# Patient Record
Sex: FEMALE | Race: WHITE | NOT HISPANIC OR LATINO
[De-identification: names, ages, dates, MRNs, and addresses within clinical notes are randomized per-mention and may not be internally consistent; named-entity substitution may affect disease eponyms.]

---

## 2019-05-20 ENCOUNTER — APPOINTMENT (OUTPATIENT)
Dept: VASCULAR SURGERY | Facility: CLINIC | Age: 84
End: 2019-05-20
Payer: MEDICARE

## 2019-05-20 VITALS — OXYGEN SATURATION: 98 % | DIASTOLIC BLOOD PRESSURE: 69 MMHG | SYSTOLIC BLOOD PRESSURE: 165 MMHG | HEART RATE: 101 BPM

## 2019-05-20 DIAGNOSIS — L97.429 NON-PRESSURE CHRONIC ULCER OF LEFT HEEL AND MIDFOOT WITH UNSPECIFIED SEVERITY: ICD-10-CM

## 2019-05-20 PROCEDURE — 99203 OFFICE O/P NEW LOW 30 MIN: CPT

## 2019-05-20 RX ORDER — SILVER SULFADIAZINE 10 MG/G
1 CREAM TOPICAL TWICE DAILY
Qty: 1 | Refills: 0 | Status: ACTIVE | COMMUNITY
Start: 2019-05-20 | End: 1900-01-01

## 2019-05-23 NOTE — PROCEDURE
[FreeTextEntry1] : The wound was debrided in office, Silvadene was applied and the wound was wrapped in gauze

## 2019-05-23 NOTE — ADDENDUM
[FreeTextEntry1] : This note was written by Kendal Salas on 05/20/2019  acting as scribe for Dr. Rollins.

## 2019-05-23 NOTE — ASSESSMENT
[FreeTextEntry1] : 84 y/o F with wound on R medial shin after falling on 4/28/19.The wound was debrided in office, Silvadene was applied and the wound was wrapped in gauze. Patient should wash the wound daily with soap and water to get rid of the dead tissue. I prescribed Silvadene and instructed patient to apply it daily for the next week. She can go in the ocean but should not go into a pool before the wound closes. She will follow up in one week.

## 2019-05-23 NOTE — HISTORY OF PRESENT ILLNESS
[FreeTextEntry1] : 82 y/o F presents today for initial evaluation of wound on R medial shin she sustained after falling on 4/28/19 at her home. She reports that she was bleeding profusely so they went to the ER where they stitched up the wound. Then, when the wound started to become red and painful, patient went to two different urgent care clinics where they told her that the wound was infected and she was given an oral antibiotic. She also went to a dermatologist who told her to clean the wound everyday and apply Aquaphor. Patient was told to go to a wound care center but she couldn't find one and was referred here instead. Patient presents here three weeks later for evaluation of the non-healing wound.\par \par She also reports that she had a recent MRI of the R knee which demonstrated meniscal and ligament tears. Patient went to surgeon, Dr. Quinonez, who informed her that these are degenerative tears that may have been present for a long time. He did not recommend a surgical intervention.

## 2019-05-23 NOTE — END OF VISIT
[FreeTextEntry3] : All medical record entries made by the Scribe were at my, Dr. Ortiz's, discretion and personally dictated by me on 05/20/2019 . I have reviewed the chart and agree that the record accurately reflects my personal performance of the history, physical exam, assessment and plan. I have also personally directed, reviewed and agreed to the chart.

## 2019-05-23 NOTE — PHYSICAL EXAM
[Respiratory Effort] : normal respiratory effort [Alert] : alert [Calm] : calm [JVD] : no jugular venous distention  [de-identified] : Well appearing. NAD [de-identified] : NCAT [de-identified] : FROM [de-identified] : wound on medial R shin above ankle

## 2019-06-03 ENCOUNTER — APPOINTMENT (OUTPATIENT)
Dept: VASCULAR SURGERY | Facility: CLINIC | Age: 84
End: 2019-06-03
Payer: MEDICARE

## 2019-06-03 PROCEDURE — 99212 OFFICE O/P EST SF 10 MIN: CPT

## 2019-06-04 NOTE — END OF VISIT
[FreeTextEntry3] : All medical record entries made by the Scribe were at my, Dr. Ortiz's, discretion and personally dictated by me on 06/03/2019 . I have reviewed the chart and agree that the record accurately reflects my personal performance of the history, physical exam, assessment and plan. I have also personally directed, reviewed and agreed to the chart.

## 2019-06-04 NOTE — PHYSICAL EXAM
[Respiratory Effort] : normal respiratory effort [Alert] : alert [Calm] : calm [de-identified] : Well appearing. NAD [JVD] : no jugular venous distention  [de-identified] : NCAT [de-identified] : FROM [de-identified] : wound on medial R shin above ankle, improved

## 2019-06-04 NOTE — HISTORY OF PRESENT ILLNESS
[FreeTextEntry1] : 82 y/o F presents for follow up evaluation of wound on R medial shin after a fall on 4/28/19. She reports that she has been compliant with wound care and notices improvement. She reports that the wound continues to seep occasionally into a Band-Aid.

## 2019-06-04 NOTE — ASSESSMENT
[FreeTextEntry1] : 82 y/o F with wound on R medial shin, improved from previous. She should continue to wash the wound and apply Neosporin.\par Follow up PRN.

## 2019-06-04 NOTE — ADDENDUM
[FreeTextEntry1] : This note was written by Kendal Salas on 06/03/2019  acting as scribe for Dr. Rollins.

## 2019-12-11 ENCOUNTER — OUTPATIENT (OUTPATIENT)
Dept: OUTPATIENT SERVICES | Facility: HOSPITAL | Age: 84
LOS: 1 days | End: 2019-12-11
Payer: MEDICARE

## 2019-12-11 ENCOUNTER — APPOINTMENT (OUTPATIENT)
Age: 84
End: 2019-12-11

## 2019-12-11 VITALS
RESPIRATION RATE: 16 BRPM | DIASTOLIC BLOOD PRESSURE: 61 MMHG | TEMPERATURE: 99 F | HEIGHT: 65 IN | SYSTOLIC BLOOD PRESSURE: 125 MMHG | WEIGHT: 151.9 LBS | OXYGEN SATURATION: 96 % | HEART RATE: 104 BPM

## 2019-12-11 VITALS
RESPIRATION RATE: 16 BRPM | HEART RATE: 102 BPM | OXYGEN SATURATION: 96 % | SYSTOLIC BLOOD PRESSURE: 120 MMHG | DIASTOLIC BLOOD PRESSURE: 64 MMHG | TEMPERATURE: 99 F

## 2019-12-11 DIAGNOSIS — D50.9 IRON DEFICIENCY ANEMIA, UNSPECIFIED: ICD-10-CM

## 2019-12-11 PROCEDURE — 96365 THER/PROPH/DIAG IV INF INIT: CPT

## 2019-12-11 RX ORDER — ACETAMINOPHEN 500 MG
650 TABLET ORAL ONCE
Refills: 0 | Status: COMPLETED | OUTPATIENT
Start: 2019-12-11 | End: 2019-12-11

## 2019-12-11 RX ORDER — FERUMOXYTOL 510 MG/17ML
510 INJECTION INTRAVENOUS ONCE
Refills: 0 | Status: COMPLETED | OUTPATIENT
Start: 2019-12-11 | End: 2019-12-11

## 2019-12-11 RX ADMIN — Medication 650 MILLIGRAM(S): at 10:00

## 2019-12-11 RX ADMIN — FERUMOXYTOL 510 MILLIGRAM(S): 510 INJECTION INTRAVENOUS at 11:05

## 2019-12-11 RX ADMIN — FERUMOXYTOL 117 MILLIGRAM(S): 510 INJECTION INTRAVENOUS at 10:05

## 2019-12-16 ENCOUNTER — OUTPATIENT (OUTPATIENT)
Dept: OUTPATIENT SERVICES | Facility: HOSPITAL | Age: 84
LOS: 1 days | End: 2019-12-16
Payer: MEDICARE

## 2019-12-16 ENCOUNTER — APPOINTMENT (OUTPATIENT)
Age: 84
End: 2019-12-16

## 2019-12-16 VITALS
SYSTOLIC BLOOD PRESSURE: 146 MMHG | OXYGEN SATURATION: 98 % | HEART RATE: 76 BPM | TEMPERATURE: 98 F | DIASTOLIC BLOOD PRESSURE: 70 MMHG | RESPIRATION RATE: 18 BRPM

## 2019-12-16 DIAGNOSIS — D50.9 IRON DEFICIENCY ANEMIA, UNSPECIFIED: ICD-10-CM

## 2019-12-16 PROCEDURE — 96365 THER/PROPH/DIAG IV INF INIT: CPT

## 2019-12-16 RX ORDER — ACETAMINOPHEN 500 MG
650 TABLET ORAL ONCE
Refills: 0 | Status: COMPLETED | OUTPATIENT
Start: 2019-12-16 | End: 2019-12-16

## 2019-12-16 RX ORDER — FERUMOXYTOL 510 MG/17ML
510 INJECTION INTRAVENOUS ONCE
Refills: 0 | Status: COMPLETED | OUTPATIENT
Start: 2019-12-16 | End: 2019-12-16

## 2019-12-16 RX ADMIN — FERUMOXYTOL 117 MILLIGRAM(S): 510 INJECTION INTRAVENOUS at 13:39

## 2019-12-16 RX ADMIN — FERUMOXYTOL 510 MILLIGRAM(S): 510 INJECTION INTRAVENOUS at 14:39

## 2019-12-16 RX ADMIN — Medication 650 MILLIGRAM(S): at 13:20

## 2020-02-21 ENCOUNTER — APPOINTMENT (OUTPATIENT)
Dept: VASCULAR SURGERY | Facility: CLINIC | Age: 85
End: 2020-02-21
Payer: MEDICARE

## 2020-02-21 PROCEDURE — 99213 OFFICE O/P EST LOW 20 MIN: CPT

## 2020-02-21 PROCEDURE — 93971 EXTREMITY STUDY: CPT

## 2020-02-21 NOTE — ADDENDUM
[FreeTextEntry1] : This note was written by Mayra Diane on 02/21/2020 acting as scribe for Dr. Rollins.

## 2020-02-21 NOTE — HISTORY OF PRESENT ILLNESS
[FreeTextEntry1] : 83 y/o F presents for follow-up evaluation of wound on R medial shin after a fall on 4/28/19. She reports that she has had several falls since her last visit. She was last hospitalized at [facility]. She has been receiving PRP shots that has been helpful to her pain. Otherwise, she continues to be compliant with wound care and her R median shin ulcer is improving. \par \par \par

## 2020-02-21 NOTE — PHYSICAL EXAM
[Respiratory Effort] : normal respiratory effort [Calm] : calm [Alert] : alert [JVD] : no jugular venous distention  [de-identified] : Well appearing. NAD [de-identified] : NCAT [de-identified] : FROM [de-identified] : wound on medial R shin above ankle, improved

## 2023-09-13 NOTE — ASSESSMENT
[FreeTextEntry1] : 85 y/o F with R medial shin ulcer 2/2 to trauma. Patient is doing well today. The ulcer appears to be improving and healing well. No drainage or redness noted. She should continue to wash the wound and apply Neosporin to the area. LLE Venous Duplex shows no evidence of DVT or SVT. Given the finding, I informed the patient that the pain is not vascular in nature and is most likely musculoskeletal. She is advised to apply heating pads to the area and take OTC painkillers to manage her pain. To follow up PRN. [Arterial/Venous Disease] : arterial/venous disease Product 33 Units: 0 Product 49 Price (In Dollars - Numeric Only, No Special Characters Or $): 0.00 Name Of Product 2: Revision - Intellishade Matte Send Charges To Patient Encounter: Yes Assigning Risk Information: Per AMA, level of risk is based upon consequences of the problem(s) addressed at the encounter when appropriately treated. Risk also includes medical decision making related to the need to initiate or forego further testing, treatment and/or hospitalization. Over the counter medication are assigned a risk level of low. Prescription medication management is assigned a risk level of moderate. Name Of Product 3: Revision - Intellishade Clear Detail Level: Zone Product 3 Application Directions: Apply liberally 15 minutes before sun exposure. Reapply after 80 minutes of swimming or sweating, and at least every 2 hours. Risk Of Complication Category: No MDM Name Of Product 1: Revision - Intellishade Original Product 3 Units: 1